# Patient Record
Sex: FEMALE | Race: WHITE | NOT HISPANIC OR LATINO | URBAN - METROPOLITAN AREA
[De-identification: names, ages, dates, MRNs, and addresses within clinical notes are randomized per-mention and may not be internally consistent; named-entity substitution may affect disease eponyms.]

---
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
## 2023-03-09 ENCOUNTER — APPOINTMENT (RX ONLY)
Dept: URBAN - METROPOLITAN AREA CLINIC 322 | Facility: CLINIC | Age: 23
Setting detail: DERMATOLOGY
End: 2023-03-09

## 2023-03-09 DIAGNOSIS — L23.9 ALLERGIC CONTACT DERMATITIS, UNSPECIFIED CAUSE: ICD-10-CM | Status: INADEQUATELY CONTROLLED

## 2023-03-09 PROCEDURE — ? COUNSELING

## 2023-03-09 PROCEDURE — ? COUNSELING: TOPICAL STEROIDS

## 2023-03-09 PROCEDURE — ? MEDICATION COUNSELING

## 2023-03-09 PROCEDURE — 99202 OFFICE O/P NEW SF 15 MIN: CPT

## 2023-03-09 ASSESSMENT — SEVERITY ASSESSMENT 2020: SEVERITY 2020: CLEAR

## 2023-03-09 ASSESSMENT — ITCH NUMERIC RATING SCALE: HOW SEVERE IS YOUR ITCHING?: 2

## 2023-03-09 NOTE — PROCEDURE: MEDICATION COUNSELING
Topical Clindamycin Counseling: Patient counseled that this medication may cause skin irritation or allergic reactions.  In the event of skin irritation, the patient was advised to reduce the amount of the drug applied or use it less frequently.   The patient verbalized understanding of the proper use and possible adverse effects of clindamycin.  All of the patient's questions and concerns were addressed. Odomzo Counseling- I discussed with the patient the risks of Odomzo including but not limited to nausea, vomiting, diarrhea, constipation, weight loss, changes in the sense of taste, decreased appetite, muscle spasms, and hair loss. The patient verbalized understanding of the proper use and possible adverse effects of Odomzo. All of the patient's questions and concerns were addressed.

## 2023-03-09 NOTE — HPI: ECZEMA (PATIENT REPORTED)
Where Is Your Eczema Located?: Eyelids
Additional Comments (Use Complete Sentences): PT works in nail salon, attributes fumes to condition

## 2023-03-09 NOTE — PROCEDURE: MEDICATION COUNSELING
Patient attended Phase 2 Cardiac Rehab Exercise Session. Further documentation will be completed in [facility] [facility] and will be scanned into the medical record upon discharge.     Birth Control Pills Pregnancy And Lactation Text: This medication should be avoided if pregnant and for the first 30 days post-partum.

## 2023-03-09 NOTE — PROCEDURE: COUNSELING
Detail Level: Detailed
Patient Specific Counseling (Will Not Stick From Patient To Patient): role of biopsy and patch testing discussed

## 2025-05-02 NOTE — PROCEDURE: MEDICATION COUNSELING
05/02/25 1459   Post-Hemodialysis Assessment   Blood Volume Processed (Liters) 63.2 L   Dialyzer Clearance Lightly streaked   Duration of Treatment 180 minutes   Additional Fluid Intake (mL) 700 mL   Total UF (mL) 1000 mL   Net Fluid Removal 300   Patient Response to Treatment tolerated fairly   Post-Hemodialysis Comments BP dropped into low 90s 2 hours into tx. and pt. was not feeling well, 200cc bolus was given and UF turned off. She felt better after 15 minutes and was able to complete dialysis. Currently denies complain, VSS and report given to primary nurse. NET fluid removed  300cc.        Simponi Counseling:  I discussed with the patient the risks of golimumab including but not limited to myelosuppression, immunosuppression, autoimmune hepatitis, demyelinating diseases, lymphoma, and serious infections.  The patient understands that monitoring is required including a PPD at baseline and must alert us or the primary physician if symptoms of infection or other concerning signs are noted.